# Patient Record
Sex: MALE | Race: OTHER
[De-identification: names, ages, dates, MRNs, and addresses within clinical notes are randomized per-mention and may not be internally consistent; named-entity substitution may affect disease eponyms.]

---

## 2019-02-10 ENCOUNTER — HOSPITAL ENCOUNTER (EMERGENCY)
Dept: HOSPITAL 72 - EMR | Age: 29
Discharge: HOME | End: 2019-02-10
Payer: SELF-PAY

## 2019-02-10 VITALS — BODY MASS INDEX: 28.79 KG/M2 | HEIGHT: 68 IN | WEIGHT: 190 LBS

## 2019-02-10 VITALS — DIASTOLIC BLOOD PRESSURE: 96 MMHG | SYSTOLIC BLOOD PRESSURE: 155 MMHG

## 2019-02-10 VITALS — SYSTOLIC BLOOD PRESSURE: 148 MMHG | DIASTOLIC BLOOD PRESSURE: 80 MMHG

## 2019-02-10 DIAGNOSIS — R30.0: ICD-10-CM

## 2019-02-10 DIAGNOSIS — R33.9: Primary | ICD-10-CM

## 2019-02-10 LAB
APPEARANCE UR: CLEAR
APTT PPP: YELLOW S
GLUCOSE UR STRIP-MCNC: NEGATIVE MG/DL
KETONES UR QL STRIP: NEGATIVE
LEUKOCYTE ESTERASE UR QL STRIP: NEGATIVE
NITRITE UR QL STRIP: NEGATIVE
PH UR STRIP: 6 [PH] (ref 4.5–8)
PROT UR QL STRIP: (no result)
SP GR UR STRIP: 1.01 (ref 1–1.03)
UROBILINOGEN UR-MCNC: NORMAL MG/DL (ref 0–1)

## 2019-02-10 PROCEDURE — 99284 EMERGENCY DEPT VISIT MOD MDM: CPT

## 2019-02-10 PROCEDURE — 81003 URINALYSIS AUTO W/O SCOPE: CPT

## 2019-02-10 PROCEDURE — 51702 INSERT TEMP BLADDER CATH: CPT

## 2019-02-10 NOTE — NUR
ED Nurse Note:

PT. AAOX4. LEFT WITH STEADY GAIT. PT. EDUCATION DOEN REGARDING D/C PAPERS AND 
PRESCRIPTIONS.PT VERBALIZED THE UNDERSTANDING OF THE TEACHING. VSS.ID [place] 
REMOVED

## 2019-02-10 NOTE — NUR
ED Nurse Note:



Pt BIBA from home due to RUQ abdomen and "unable to urinate" x 2 days. AOx4, 
VSS ling. Will cont to monitor.

## 2019-02-10 NOTE — NUR
ED Nurse Note:



F/C removed by nurse. pt tolerated well. 600cc of more urine was in the bag. 
urine clear and yellow. pt reported resolved pain level 0/10.

## 2019-02-10 NOTE — NUR
ED Nurse Note:



F/C 16 Fr was inserted by nurse and 350cc of clear and yellow urine came out 
upon insertion of catheral. pt tolerated well with procedure. pt reported 
"Abdominal pain is getting better now to 6/10." will keep monitoring for urine 
output and pain level.

## 2019-02-11 NOTE — EMERGENCY ROOM REPORT
History of Present Illness


General


Chief Complaint:  General Complaint


Source:  Patient





Present Illness


HPI


28-year-old male presents to ED for evaluation.  Patient brought in by EMS for 

evaluation of abdominal pain.  States that he's been unable to urinate for the 

last 2 days.  Pain is sharp, 8 out of 10, nonradiating.  Initially started as 

dysuria a few weeks ago.  Denies sick medical attention at the time.  Denies 

fevers or chills.  Denies flank pain.  Denies nausea or vomiting.  Denies any 

history of prostate problems.  Denies taking any medications.  No other 

aggravating relieving factors.  Denies any other associated symptoms


Allergies:  


Coded Allergies:  


     No Known Allergies (Unverified , 2/10/19)





Patient History


Past Medical History:  none


Past Surgical History:  none


Pertinent Family History:  none


Social History:  Denies: smoking, alcohol use, drug use


Immunizations:  UTD


Reviewed Nursing Documentation:  PMH: Agreed; PSxH: Agreed





Nursing Documentation-PMH


Past Medical History:  No Stated History





Review of Systems


All Other Systems:  negative except mentioned in HPI





Physical Exam





Vital Signs








  Date Time  Temp Pulse Resp B/P (MAP) Pulse Ox O2 Delivery O2 Flow Rate FiO2


 


2/10/19 12:49 97.5 106 18 166/115 99 Room Air  








Sp02 EP Interpretation:  reviewed, normal


General Appearance:  no apparent distress, alert, GCS 15, non-toxic


Head:  normocephalic, atraumatic


Eyes:  bilateral eye normal inspection, bilateral eye PERRL


ENT:  hearing grossly normal, normal pharynx, no angioedema, normal voice


Neck:  full range of motion, supple/symm/no masses


Respiratory:  chest non-tender, lungs clear, normal breath sounds, speaking 

full sentences


Cardiovascular #1:  regular rate, rhythm, no edema


Cardiovascular #2:  2+ carotid (R), 2+ carotid (L), 2+ radial (R), 2+ radial (L)

, 2+ dorsalis pedis (R), 2+ dorsalis pedis (L)


Gastrointestinal:  normal bowel sounds, soft, no guarding, no rebound, 

tenderness - sprapubic


Rectal:  deferred


Genitourinary:  normal inspection, no CVA tenderness


Musculoskeletal:  back normal, gait/station normal, normal range of motion, non-

tender


Neurologic:  alert, oriented x3, responsive, motor strength/tone normal, 

sensory intact, speech normal


Psychiatric:  judgement/insight normal, memory normal, mood/affect normal, no 

suicidal/homicidal ideation


Reflexes:  3+ bicep (R), 3+ bicep (L), 3+ tricep (R), 3+ tricep (L), 3+ knee (R)

, 3+ knee (L)


Skin:  normal color, no rash, warm/dry, well hydrated


Lymphatic:  no adenopathy





Medical Decision Making


Diagnostic Impression:  


 Primary Impression:  


 Urinary retention


ER Course


Hospital Course 


28-year-old M presents to ED complaining of urinary retention x 2 days 





Differential diagnoses include: obstruction, UTI, BPH 





Clinical course


Patient placed on stretcher.  After initial history and physical I ordered UA, 

duncan cather with immediate relief of obstruction and pain 





On reassessment patient feels much better.  Abdomen no longer tender or 

distended. 





UA noted to be unremarkable. 





Discussed findings with patient.  Patient has no known history of prostate 

problems.  Not on any psychiatric drugs which could cause urinary retention.  

No signs of urinary infection.  Duncan catheter removed.  We'll discharged with 

Flomax.





Patient states he does not have a PMD.  We'll provide referralsl. safe for 

discharge





Diagnosis - urinary retention 





Stable and discharged home with Rx Flomax.  Instructed to followup with PMD/

urologist.  Return to ED if symptoms recur or worsen





Labs








Test


  2/10/19


13:40


 


Urine Color Yellow 


 


Urine Appearance Clear 


 


Urine pH 6 (4.5-8.0) 


 


Urine Specific Gravity


  1.015


(1.005-1.035)


 


Urine Protein 2+ (NEGATIVE) 


 


Urine Glucose (UA)


  Negative


(NEGATIVE)


 


Urine Ketones


  Negative


(NEGATIVE)


 


Urine Blood 1+ (NEGATIVE) 


 


Urine Nitrite


  Negative


(NEGATIVE)


 


Urine Bilirubin


  Negative


(NEGATIVE)


 


Urine Urobilinogen


  Normal MG/DL


(0.0-1.0)


 


Urine Leukocyte Esterase


  Negative


(NEGATIVE)


 


Urine RBC


  0-2 /HPF (0 -


0)


 


Urine WBC 0 /HPF (0 - 0) 


 


Urine Squamous Epithelial


Cells Occasional


/LPF


 


Urine Bacteria


  Occasional


/HPF (NONE)


 


Urine Mucus


  Occasional


/LPF











Last Vital Signs








  Date Time  Temp Pulse Resp B/P (MAP) Pulse Ox O2 Delivery O2 Flow Rate FiO2


 


2/10/19 14:17 98.0 86 16 148/80 100 Room Air  








Status:  improved


Disposition:  HOME, SELF-CARE


Condition:  Stable


Scripts


Tamsulosin HCl (Flomax) 0.4 Mg Cap.er.24h


0.4 MG ORAL DAILY for 10 Days, CAP


   Prov: Richard Womack MD         2/10/19


Referrals:  


NOT CHOSEN IPA/MD,REFERRING (PCP)











[facility]











[facility]


Patient Instructions:  Acute Urinary Retention, Male, Easy-to-Read











Richard Womack MD Feb 11, 2019 14:10

## 2019-02-17 ENCOUNTER — HOSPITAL ENCOUNTER (EMERGENCY)
Dept: HOSPITAL 72 - EMR | Age: 29
Discharge: HOME | End: 2019-02-17
Payer: SELF-PAY

## 2019-02-17 VITALS — DIASTOLIC BLOOD PRESSURE: 96 MMHG | SYSTOLIC BLOOD PRESSURE: 151 MMHG

## 2019-02-17 VITALS — SYSTOLIC BLOOD PRESSURE: 151 MMHG | DIASTOLIC BLOOD PRESSURE: 96 MMHG

## 2019-02-17 VITALS — HEIGHT: 64 IN | BODY MASS INDEX: 27.31 KG/M2 | WEIGHT: 160 LBS

## 2019-02-17 VITALS — SYSTOLIC BLOOD PRESSURE: 152 MMHG | DIASTOLIC BLOOD PRESSURE: 88 MMHG

## 2019-02-17 DIAGNOSIS — R30.0: Primary | ICD-10-CM

## 2019-02-17 DIAGNOSIS — F15.10: ICD-10-CM

## 2019-02-17 DIAGNOSIS — R10.30: ICD-10-CM

## 2019-02-17 DIAGNOSIS — R00.0: ICD-10-CM

## 2019-02-17 LAB
ADD MANUAL DIFF: NO
ALBUMIN SERPL-MCNC: 4.6 G/DL (ref 3.4–5)
ALBUMIN/GLOB SERPL: 1.2 {RATIO} (ref 1–2.7)
ALP SERPL-CCNC: 107 U/L (ref 46–116)
ALT SERPL-CCNC: 67 U/L (ref 12–78)
ANION GAP SERPL CALC-SCNC: 16 MMOL/L (ref 5–15)
APPEARANCE UR: CLEAR
APTT PPP: YELLOW S
AST SERPL-CCNC: 31 U/L (ref 15–37)
BASOPHILS NFR BLD AUTO: 0.8 % (ref 0–2)
BILIRUB SERPL-MCNC: 0.4 MG/DL (ref 0.2–1)
BUN SERPL-MCNC: 9 MG/DL (ref 7–18)
CALCIUM SERPL-MCNC: 9.4 MG/DL (ref 8.5–10.1)
CHLORIDE SERPL-SCNC: 103 MMOL/L (ref 98–107)
CO2 SERPL-SCNC: 23 MMOL/L (ref 21–32)
CREAT SERPL-MCNC: 0.9 MG/DL (ref 0.55–1.3)
EOSINOPHIL NFR BLD AUTO: 0.3 % (ref 0–3)
ERYTHROCYTE [DISTWIDTH] IN BLOOD BY AUTOMATED COUNT: 11.4 % (ref 11.6–14.8)
GLOBULIN SER-MCNC: 3.9 G/DL
GLUCOSE UR STRIP-MCNC: NEGATIVE MG/DL
HCT VFR BLD CALC: 47.7 % (ref 42–52)
HGB BLD-MCNC: 16.4 G/DL (ref 14.2–18)
KETONES UR QL STRIP: (no result)
LEUKOCYTE ESTERASE UR QL STRIP: NEGATIVE
LYMPHOCYTES NFR BLD AUTO: 20.9 % (ref 20–45)
MCV RBC AUTO: 87 FL (ref 80–99)
MONOCYTES NFR BLD AUTO: 6.8 % (ref 1–10)
NEUTROPHILS NFR BLD AUTO: 71.2 % (ref 45–75)
NITRITE UR QL STRIP: NEGATIVE
PH UR STRIP: 6 [PH] (ref 4.5–8)
PLATELET # BLD: 270 K/UL (ref 150–450)
POTASSIUM SERPL-SCNC: 3.5 MMOL/L (ref 3.5–5.1)
PROT UR QL STRIP: (no result)
RBC # BLD AUTO: 5.47 M/UL (ref 4.7–6.1)
SODIUM SERPL-SCNC: 141 MMOL/L (ref 136–145)
SP GR UR STRIP: 1.02 (ref 1–1.03)
UROBILINOGEN UR-MCNC: NORMAL MG/DL (ref 0–1)
WBC # BLD AUTO: 7.9 K/UL (ref 4.8–10.8)

## 2019-02-17 PROCEDURE — 51702 INSERT TEMP BLADDER CATH: CPT

## 2019-02-17 PROCEDURE — 81003 URINALYSIS AUTO W/O SCOPE: CPT

## 2019-02-17 PROCEDURE — 71045 X-RAY EXAM CHEST 1 VIEW: CPT

## 2019-02-17 PROCEDURE — 80307 DRUG TEST PRSMV CHEM ANLYZR: CPT

## 2019-02-17 PROCEDURE — 96361 HYDRATE IV INFUSION ADD-ON: CPT

## 2019-02-17 PROCEDURE — 99284 EMERGENCY DEPT VISIT MOD MDM: CPT

## 2019-02-17 PROCEDURE — 80329 ANALGESICS NON-OPIOID 1 OR 2: CPT

## 2019-02-17 PROCEDURE — 96374 THER/PROPH/DIAG INJ IV PUSH: CPT

## 2019-02-17 PROCEDURE — 85025 COMPLETE CBC W/AUTO DIFF WBC: CPT

## 2019-02-17 PROCEDURE — 80053 COMPREHEN METABOLIC PANEL: CPT

## 2019-02-17 PROCEDURE — 83690 ASSAY OF LIPASE: CPT

## 2019-02-17 PROCEDURE — 96375 TX/PRO/DX INJ NEW DRUG ADDON: CPT

## 2019-02-17 PROCEDURE — 74177 CT ABD & PELVIS W/CONTRAST: CPT

## 2019-02-17 PROCEDURE — 87086 URINE CULTURE/COLONY COUNT: CPT

## 2019-02-17 PROCEDURE — 36415 COLL VENOUS BLD VENIPUNCTURE: CPT

## 2019-02-17 PROCEDURE — 93005 ELECTROCARDIOGRAM TRACING: CPT

## 2019-02-17 NOTE — EMERGENCY ROOM REPORT
History of Present Illness


General


Chief Complaint:  Male Urogenital Problems


Source:  Patient





Present Illness


HPI


The patient presents again with inability to urinate.  He was seen here 

February 10 and urinalysis had proteinuria.  He was placed on Flomax.  He 

returns again with inability to urinate.  He passed a scant amount of urine 3 

or 4 hours ago.  He's been drinking a lot of fluids.  His bladder feels full 

and is painful.  The patient denies any prior medical problems.  He denies 

fever or chills.  He breaks out in sweat at times when trying to urinate.  He 

feels anxious





No chest pain, NVD, rashes, headache, extremity pain, dyspnea.  





Denies DM





Denies drugs or alcohol (see tox).


Allergies:  


Coded Allergies:  


     No Known Allergies (Unverified , 2/10/19)





Patient History


Past Medical History:  see triage record


Social History:  Denies: smoking, alcohol use, drug use - see tox


Social History Narrative


works at a restaurant


Reviewed Nursing Documentation:  PMH: Agreed; PSxH: Agreed





Nursing Documentation-PMH


Past Medical History:  No Stated History





Review of Systems


All Other Systems:  negative except mentioned in HPI





Physical Exam





Vital Signs








  Date Time  Temp Pulse Resp B/P (MAP) Pulse Ox O2 Delivery O2 Flow Rate FiO2


 


2/17/19 13:39 98.2 150 18 178/101 96 Room Air  








Sp02 EP Interpretation:  reviewed, normal


General Appearance:  alert, GCS 15, mild distress


Head:  normocephalic


Eyes:  bilateral eye normal inspection, bilateral eye PERRL, bilateral eye EOMI


ENT:  dry mucus membranes


Neck:  supple


Respiratory:  lungs clear, normal breath sounds


Cardiovascular #1:  regular rate, rhythm


Cardiovascular #2:  2+ radial (R)


Gastrointestinal:  normal inspection, normal bowel sounds, non-distended, 

tenderness - suprapubic, other - Large bladder


Genitourinary:  no CVA tenderness, penis normal - no erection


Musculoskeletal:  back normal, gait/station normal, normal range of motion


Neurologic:  alert, oriented x3, grossly normal


Psychiatric:  anxious


Skin:  normal inspection, warm/dry





Medical Decision Making


Diagnostic Impression:  


 Primary Impression:  


 Abdominal pain


 Qualified Codes:  R10.30 - Lower abdominal pain, unspecified


 Additional Impressions:  


 Amphetamine abuse


 Dysuria


 Tachycardia


ER Course


Patient repeat presents with difficulty urinating.  Differential includes UTI, 

benign prostatic hypertrophy, urinary obstruction, nephrotic syndrome amongst 

others.  He's quite tachycardic at this time.  Evaluation will be with EKG and 

labs.  He will have a Potter placed.





Potter drained 70 ml.  Still no relief.  No evidence of urinary retention.





EKG was sinus  rhythm, 84 with PT waves.  Repeat on monitor = 142 ST with PVC, 

NSSTTW changes.  Labs with normal CBC, CMP.  Tox + amphetamine.





Still with dysuria.  Potter removed.





Still with lower abdominal pain - pyridium given.





Still with lower abdominal pain.  NS begun and morphine ordered.  CT abdomen 

and pelvis ordered.  Discussed with family and friends.





CT neg.





Improved with analgesia.





No emergency at this time.





When confronted again about amphetamine he states he "just used yesterday".  I 

discussed the drugs he took might be cut with something which causes him to 

have dysuria (consider cantharidin, though no priapism, hematuria or other GI 

bleeding).





Patient stable for outpatient observation and treatment.





Laboratory Tests








Test


  2/17/19


14:20


 


White Blood Count


  7.9 K/UL


(4.8-10.8)


 


Red Blood Count


  5.47 M/UL


(4.70-6.10)


 


Hemoglobin


  16.4 G/DL


(14.2-18.0)


 


Hematocrit


  47.7 %


(42.0-52.0)


 


Mean Corpuscular Volume 87 FL (80-99)  


 


Mean Corpuscular Hemoglobin


  30.0 PG


(27.0-31.0)


 


Mean Corpuscular Hemoglobin


Concent 34.3 G/DL


(32.0-36.0)


 


Red Cell Distribution Width


  11.4 %


(11.6-14.8)  L


 


Platelet Count


  270 K/UL


(150-450)


 


Mean Platelet Volume


  7.6 FL


(6.5-10.1)


 


Neutrophils (%) (Auto)


  71.2 %


(45.0-75.0)


 


Lymphocytes (%) (Auto)


  20.9 %


(20.0-45.0)


 


Monocytes (%) (Auto)


  6.8 %


(1.0-10.0)


 


Eosinophils (%) (Auto)


  0.3 %


(0.0-3.0)


 


Basophils (%) (Auto)


  0.8 %


(0.0-2.0)


 


Urine Color Yellow  


 


Urine Appearance Clear  


 


Urine pH 6 (4.5-8.0)  


 


Urine Specific Gravity


  1.020


(1.005-1.035)


 


Urine Protein


  3+ (NEGATIVE)


H


 


Urine Glucose (UA)


  Negative


(NEGATIVE)


 


Urine Ketones


  1+ (NEGATIVE)


H


 


Urine Blood


  1+ (NEGATIVE)


H


 


Urine Nitrite


  Negative


(NEGATIVE)


 


Urine Bilirubin


  Negative


(NEGATIVE)


 


Urine Urobilinogen


  Normal MG/DL


(0.0-1.0)


 


Urine Leukocyte Esterase


  Negative


(NEGATIVE)


 


Urine RBC


  2-4 /HPF (0 -


0)  H


 


Urine WBC


  0-2 /HPF (0 -


0)


 


Urine Squamous Epithelial


Cells Few /LPF


(NONE/OCC)


 


Urine Amorphous Sediment


  Many /LPF


(NONE)  H


 


Urine Bacteria


  Moderate /HPF


(NONE)  H


 


Urine Granular Casts


  2-4 /LPF


(NONE)  H


 


Sodium Level


  141 MMOL/L


(136-145)


 


Potassium Level


  3.5 MMOL/L


(3.5-5.1)


 


Chloride Level


  103 MMOL/L


()


 


Carbon Dioxide Level


  23 MMOL/L


(21-32)


 


Anion Gap


  16 mmol/L


(5-15)  H


 


Blood Urea Nitrogen


  9 mg/dL (7-18)


 


 


Creatinine


  0.9 MG/DL


(0.55-1.30)


 


Estimate Glomerular


Filtration Rate > 60 mL/min


(>60)


 


Glucose Level


  131 MG/DL


()  H


 


Calcium Level


  9.4 MG/DL


(8.5-10.1)


 


Total Bilirubin


  0.4 MG/DL


(0.2-1.0)


 


Aspartate Amino Transferase


(AST) 31 U/L (15-37)


 


 


Alanine Aminotransferase (ALT)


  67 U/L (12-78)


 


 


Alkaline Phosphatase


  107 U/L


()


 


Total Protein


  8.5 G/DL


(6.4-8.2)  H


 


Albumin


  4.6 G/DL


(3.4-5.0)


 


Globulin 3.9 g/dL  


 


Albumin/Globulin Ratio 1.2 (1.0-2.7)  


 


Lipase


  79 U/L


()


 


Urine Opiates Screen


  Negative


(NEGATIVE)


 


Urine Barbiturates Screen


  Negative


(NEGATIVE)


 


Phencyclidine (PCP) Screen


  Negative


(NEGATIVE)


 


Urine Amphetamines Screen


  Positive


(NEGATIVE)  H


 


Urine Benzodiazepines Screen


  Negative


(NEGATIVE)


 


Urine Cocaine Screen


  Negative


(NEGATIVE)


 


Urine Marijuana (THC) Screen


  Negative


(NEGATIVE)


 


Serum Alcohol 6 mg/dL  








EKG Diagnostic Results


Rate:  tachycardiac


Rhythm:  NSR


ST Segments:  other - on the monitor he has ST - repeating EKG





Rhythm Strip Diag. Results


EP Interpretation:  yes


Rhythm:  no PVC's, no ectopy, other - Sinus tachycardia





CT/MRI/US Diagnostic Results


CT/MRI/US Diagnostic Results :  


   Imaging Test Ordered:  abd/pelvis


   Impression


no surgical pathology


No stones/hydronephrosis





Last Vital Signs








  Date Time  Temp Pulse Resp B/P (MAP) Pulse Ox O2 Delivery O2 Flow Rate FiO2


 


2/17/19 20:01 98.2 110 20 151/96 98 Room Air  





  108      








Status:  improved


Disposition:  HOME, SELF-CARE


Condition:  Improved


Scripts


Ibuprofen* (MOTRIN*) 600 Mg Tablet


600 MG ORAL Q6H PRN for For Pain, #16 TAB


   Prov: Jake Camargo MD         2/17/19 


Acetaminophen (Tylenol) 325 Mg Tablet


650 MG ORAL Q6H PRN for Prn Pain/Headache/Temp > 101, #20 TAB 0 Refills


   Prov: Jake Camargo MD         2/17/19 


Phenazopyridine Hcl* (PYRIDIUM*) 100 Mg Tablet


100 MG ORAL THREE TIMES A DAY, #10 TAB


   Prov: Jake Camargo MD         2/17/19











Jake Camargo MD Feb 17, 2019 14:13

## 2019-02-18 NOTE — DIAGNOSTIC IMAGING REPORT
Indication: Dyspnea

 

Comparison:  None

 

A single view chest radiograph was obtained.

 

Findings:

 

Cardiomediastinal appearance is within normal limits for age. The lungs are clear.

Pulmonary vascularity is appropriate. The diaphragmatic contour is smooth and

costophrenic angles are sharp. No pleural effusions are identified. The bones are

unremarkable.

 

Impression: No acute findings

## 2019-02-18 NOTE — DIAGNOSTIC IMAGING REPORT
Indication: Abdominal pain

 

Technique: Continuous helical transaxial imaging of the abdomen and pelvis was

obtained from the lung bases to the pubic symphysis during intravenous contrast

administration. Coronal 2-D reformats were also obtained. Study obtained in a Siemens

sensation 64 slice CT.  Automatic Exposure Control was utilized.

 

Total Dose length Product (DLP):  950.02 mGycm

 

CT Dose Index Volume (CTDIvol):   16.82 mGy

 

Comparison: None

 

Findings: The lung bases are clear. Small hiatal hernia noted. The gallbladder, liver

and spleen, kidneys, pancreas adrenal glands appear unremarkable. There is no free

fluid. Appendix is normal. Small amount of air noted within the bladder. Correlate

clinically. Diverticula noted in the colon.

 

IMPRESSION:

 

Air in the bladder. This could be iatrogenic from recent Potter placement. In the

absence of such history, infection is a presumed.

 

Normal appendix.

 

Small hiatal hernia.

 

 

 

 

 

The CT scanner at [facility] is accredited by the American College of

Radiology and the scans are performed using dose optimization techniques as

appropriate to a performed exam including Automatic Exposure control.

## 2021-12-18 NOTE — NUR
ED Nurse Note:



RECIEVED REPORT FROM YONATAN VELOZ TO RESUME CARE, PT IN BED AWAKE, ALERT AND 
ORIENTED X 4, WIFE AT BEDSIDE, PT IS ON CARDIAC MONITORIG, HAS SLIGHTLY 
INCREASED HEART RATE AND STATES HAS MILD CHEST PAINA T 3/10, PT STATES HE IS 
HERE FOR CHEST PAIN, STATING HE TOOK SOME DRUG WITH A FRIEND AND IT MADE HIS 
CHEST POUND AND HS IS PARANOID, PT DOES ELICIT PARANOID BEHAVIORS, TAKING OFF 
EQUIPMENT AND ASKING WHY B/P CUFF IS TIGHT, PT DECLINES DYSURIA, WILL RESUME 
CARE AS RODERED AND CONTINUE TO CLOSELY MONITOR.
ED Nurse Note:



pt being d/c to home, awake, alert and oriented x 4, ambulatory with steady 
gait, no cp, no sob, iv line and arm band removed without complications, pt 
given f/u info and after care instructions and re-verbalizes proper medication 
administration, nad noted during d/c to home with family.
ED Nurse Note:

PT. AAOX4. AMBULATORY. CAME IN TO ER DUE TO UNABLE TO URINATE SINCE LAST NIGHT. 
ABDOMINAL PAIN  NOTED ON LOWER QUADRANTS RATED 10/10. PER PT. HE HAS NOT SEEN A 
UROLOGIST. WAS RECENTLY SEEN IN ER FOR THE SAME S/S
See progress note.
0 = independent